# Patient Record
Sex: MALE | Race: BLACK OR AFRICAN AMERICAN | Employment: UNEMPLOYED | URBAN - METROPOLITAN AREA
[De-identification: names, ages, dates, MRNs, and addresses within clinical notes are randomized per-mention and may not be internally consistent; named-entity substitution may affect disease eponyms.]

---

## 2018-11-13 ENCOUNTER — HOSPITAL ENCOUNTER (EMERGENCY)
Age: 63
Discharge: HOME | End: 2018-11-13
Attending: EMERGENCY MEDICINE
Payer: MEDICARE

## 2018-11-13 VITALS
RESPIRATION RATE: 18 BRPM | TEMPERATURE: 98.3 F | WEIGHT: 199.08 LBS | HEIGHT: 67 IN | BODY MASS INDEX: 31.25 KG/M2 | OXYGEN SATURATION: 97 % | SYSTOLIC BLOOD PRESSURE: 169 MMHG | DIASTOLIC BLOOD PRESSURE: 68 MMHG | HEART RATE: 85 BPM

## 2018-11-13 DIAGNOSIS — L97.211 VENOUS STASIS ULCER OF RIGHT CALF LIMITED TO BREAKDOWN OF SKIN, UNSPECIFIED WHETHER VARICOSE VEINS PRESENT (HCC): Primary | ICD-10-CM

## 2018-11-13 DIAGNOSIS — I83.012 VENOUS STASIS ULCER OF RIGHT CALF LIMITED TO BREAKDOWN OF SKIN, UNSPECIFIED WHETHER VARICOSE VEINS PRESENT (HCC): Primary | ICD-10-CM

## 2018-11-13 PROCEDURE — 99283 EMERGENCY DEPT VISIT LOW MDM: CPT

## 2018-11-13 NOTE — ED PROVIDER NOTES
60 y.o. male with past medical history significant for Hepatitis C, diabetes, and hypertension who presents from home via a private vehicle with chief complaint of chronic right lower leg wounds. Pt reports onset of chronic leg wounds about 2 months ago. Pt reports he has mild-moderate pain associated with the leg wounds. Pt reports he saw his PCP two weeks ago for this problem and was referred to [address] [facility]. Pt notes that he was told by the wound healing center that they cannot take him until March, 2019. Pt states he has been unable to reach his PCP about this conflict. Pt notes he has constant leg swelling, but notes it is worse today. Pt states that he is taking diuretic medications. Pt denies any fever, chills, nausea, or vomiting. There are no other acute medical concerns at this time. Social hx - Tobacco use: former smoker, Alcohol Use: none PCP: Hawa Acuña MD 
 
Note written by Angeline Flores, as dictated by Marian Grady MD 5:40 PM. The history is provided by the patient. No  was used. Past Medical History:  
Diagnosis Date  Diabetes ([address]  Hepatitis C   
 Hypertension  Infectious disease   
 hepatitis C History reviewed. No pertinent surgical history. History reviewed. No pertinent family history. Social History Socioeconomic History  Marital status: SINGLE Spouse name: Not on file  Number of children: Not on file  Years of education: Not on file  Highest education level: Not on file Social Needs  Financial resource strain: Not on file  Food insecurity - worry: Not on file  Food insecurity - inability: Not on file  Transportation needs - medical: Not on file  Transportation needs - non-medical: Not on file Occupational History  Not on file Tobacco Use  Smoking status: Former Smoker  Smokeless tobacco: Never Used Substance and Sexual Activity  Alcohol use: No  
 Drug use: No  
 Sexual activity: Not on file Other Topics Concern  Not on file Social History Narrative  Not on file ALLERGIES: Tylenol [acetaminophen] Review of Systems Constitutional: Negative for chills and fever. HENT: Negative for rhinorrhea and sore throat. Respiratory: Negative for cough and shortness of breath. Cardiovascular: Positive for leg swelling (Chronic). Negative for chest pain. Gastrointestinal: Negative for abdominal pain, diarrhea, nausea and vomiting. Genitourinary: Negative for dysuria and hematuria. Musculoskeletal: Negative for arthralgias and myalgias. Skin: Positive for wound. Negative for pallor and rash. Neurological: Negative for dizziness, weakness and light-headedness. All other systems reviewed and are negative. Vitals:  
 11/13/18 1733 11/13/18 1745 BP:  169/68 Pulse: 85 85 Resp: 18 18 Temp:  98.3 °F (36.8 °C) SpO2: 98% 97% Weight:  90.3 kg (199 lb 1.2 oz) Height:  5' 7\" (1.702 m) Physical Exam  
Vital signs reviewed. Nursing notes reviewed. Const:  No acute distress, well developed, well nourished Head:  Atraumatic, normocephalic Eyes:  PERRL, conjunctiva normal, no scleral icterus Neck:  Supple, trachea midline Cardiovascular:  RRR, no murmurs, no gallops, no rubs Resp:  No resp distress, no increased work of breathing, no wheezes, no rhonchi, no rales, Abd:  Soft, non-tender, non-distended, no rebound, no guarding, no CVA tenderness :  Deferred MSK:  bilateral pedal edema, normal ROM Neuro:  Alert and oriented x3, no cranial nerve defect Skin:  Warm, dry, multiple superficial ulcers on right lower extremity. No erythema or active drainage Psych: normal mood and affect, behavior is normal, judgement and thought content is normal 
 
Note written by Angeline Rudd, as dictated by Neal Yu MD 5:40 PM. MDM 
 Number of Diagnoses or Management Options Venous stasis ulcer of right calf limited to breakdown of skin, unspecified whether varicose veins present ([facility] [address]):  
 
   
 
Pt. Presents to the ER with complaints of ulcers and chronic wounds to his legs. No signs of infection. Pt. Given info to f/u with wound care. Pt. To return to the ER with worsening sx. Procedures 6:10 PM 
Patient's results have been reviewed with them. Patient and/or family have verbally conveyed their understanding and agreement of the patient's signs, symptoms, diagnosis, treatment and prognosis and additionally agree to follow up as recommended or return to the Emergency Room should their condition change prior to follow-up. Discharge instructions have also been provided to the patient with some educational information regarding their diagnosis as well a list of reasons why they would want to return to the ER prior to their follow-up appointment should their condition change.

## 2018-11-13 NOTE — ED TRIAGE NOTES
Patient arrives from home for wounds on RIGHT lower leg for two months. Reports he was supposed to follow up with wound care after seeing PCP 10/28 but they were not taking new patients. Low leg edema noted upon arrival. Denies fevers/chills. Open wounds noted to RIGHT lower leg.

## 2018-12-14 ENCOUNTER — HOSPITAL ENCOUNTER (OUTPATIENT)
Dept: WOUND CARE | Age: 63
Discharge: HOME | End: 2018-12-14
Payer: MEDICARE

## 2018-12-14 VITALS
BODY MASS INDEX: 32.02 KG/M2 | TEMPERATURE: 98.3 F | DIASTOLIC BLOOD PRESSURE: 73 MMHG | RESPIRATION RATE: 18 BRPM | WEIGHT: 204 LBS | HEART RATE: 73 BPM | SYSTOLIC BLOOD PRESSURE: 169 MMHG | HEIGHT: 67 IN

## 2018-12-14 PROBLEM — L97.922 NON-PRESSURE ULCER OF LEFT LOWER EXTREMITY WITH FAT LAYER EXPOSED (HCC): Status: ACTIVE | Noted: 2018-12-14

## 2018-12-14 PROBLEM — L97.912 NON-PRESSURE ULCER OF RIGHT LOWER EXTREMITY WITH FAT LAYER EXPOSED (HCC): Status: ACTIVE | Noted: 2018-12-14

## 2018-12-14 PROBLEM — R60.0 BILATERAL LOWER EXTREMITY EDEMA: Status: ACTIVE | Noted: 2018-12-14

## 2018-12-14 PROBLEM — I73.9 PVD (PERIPHERAL VASCULAR DISEASE) (HCC): Status: ACTIVE | Noted: 2018-12-14

## 2018-12-14 PROCEDURE — 74011000250 HC RX REV CODE- 250: Performed by: PODIATRIST

## 2018-12-14 PROCEDURE — 99215 OFFICE O/P EST HI 40 MIN: CPT

## 2018-12-14 RX ORDER — GLIPIZIDE 5 MG/1
5 TABLET ORAL DAILY
COMMUNITY

## 2018-12-14 RX ORDER — PRAVASTATIN SODIUM 20 MG/1
20 TABLET ORAL
COMMUNITY

## 2018-12-14 RX ORDER — FAMOTIDINE 20 MG/1
20 TABLET, FILM COATED ORAL 2 TIMES DAILY
COMMUNITY

## 2018-12-14 RX ORDER — OXYCODONE HYDROCHLORIDE 20 MG/1
TABLET ORAL
COMMUNITY

## 2018-12-14 RX ORDER — AMLODIPINE BESYLATE 10 MG/1
10 TABLET ORAL DAILY
COMMUNITY

## 2018-12-14 RX ORDER — METOPROLOL TARTRATE 100 MG/1
100 TABLET ORAL 2 TIMES DAILY
COMMUNITY

## 2018-12-14 RX ORDER — ONDANSETRON 4 MG/1
4 TABLET, FILM COATED ORAL
COMMUNITY

## 2018-12-14 RX ORDER — HYDRALAZINE HYDROCHLORIDE 50 MG/1
50 TABLET, FILM COATED ORAL 3 TIMES DAILY
COMMUNITY

## 2018-12-14 RX ADMIN — Medication: at 10:25

## 2018-12-14 NOTE — WOUND CARE
Visit Vitals  /73   Pulse 73   Temp 98.3 °F (36.8 °C)   Resp 18   Ht 5' 7\" (1.702 m)   Wt 92.5 kg (204 lb)   BMI 31.95 kg/m²        12/14/18 1016   Wound Leg Lower Right   Date First Assessed/Time First Assessed: 12/14/18 1014   POA: Yes  Wound Type: Diabetic  Location: Leg Lower  Wound Description (Optional): Cluster  Orientation: Right   DRESSING STATUS Breakthrough drainage   DRESSING TYPE Gauze;ABD pad;Gauze wrap (aria)   Wound Length (cm) 22.5 cm   Wound Width (cm) 44 cm   Wound Depth (cm) 0.2   Wound Surface area (cm^2) 990 cm^2   Condition of Base Pink;Slough   Drainage Amount  Moderate   Drainage Color Serosanguinous   Wound Odor None   Periwound Skin Condition Edematous   Wound Leg Lower Left;Lateral   Date First Assessed/Time First Assessed: 12/14/18 1015   POA: Yes  Wound Type: Diabetic  Location: Leg Lower  Orientation: Left;Lateral   DRESSING STATUS Clean, dry, and intact   DRESSING TYPE Open to air   Wound Length (cm) 0.6 cm   Wound Width (cm) 0.4 cm   Wound Depth (cm) 0.1   Wound Surface area (cm^2) 0.24 cm^2   Condition of Base Pink   Drainage Amount  None   Wound Odor None   Periwound Skin Condition Edematous

## 2018-12-14 NOTE — WOUND CARE
Wound Date Acquired:   2 months     Length of time wound has been present:   2 months    Wound condition at initial assessment based on date acquired:   chronic >30days     How was the wound acquired:  Patient scratched leg    Who referred to wound clinic:    Kristi    Antibiotic use current or past:   no    Which antibiotic:   n/a    Any cultures done:   n/a    Cultures done by who:   n/a    How as the wound be treated:    gauze and rolled gauze    Other information:   Patient is 60year old black male who has hep c, DM and HTN. Wounds on right and left lower leg.

## 2018-12-14 NOTE — WOUND CARE
Discharge Condition: stable  Ambulatory Status:  ambulatory  Discharge Destination: home  Transportation: personal car  Accompanied by: self

## 2019-01-03 ENCOUNTER — HOSPITAL ENCOUNTER (OUTPATIENT)
Dept: WOUND CARE | Age: 64
Discharge: HOME | End: 2019-01-03
Payer: MEDICARE

## 2019-01-03 VITALS
RESPIRATION RATE: 16 BRPM | TEMPERATURE: 98.3 F | DIASTOLIC BLOOD PRESSURE: 68 MMHG | SYSTOLIC BLOOD PRESSURE: 148 MMHG | HEART RATE: 81 BPM

## 2019-01-03 PROCEDURE — 99215 OFFICE O/P EST HI 40 MIN: CPT

## 2019-01-03 NOTE — WOUND CARE
01/03/19 1154 Wound Leg Lower Right Date First Assessed/Time First Assessed: 12/14/18 1014   POA: Yes  Wound Type: Diabetic  Location: Leg Lower  Wound Description (Optional): Cluster  Orientation: Right Dressing Type Applied Alginate;Gauze;Gauze wrap (aria); Silver products (Tubi X2) Wound Leg Lower Left;Lateral  
Date First Assessed/Time First Assessed: 12/14/18 1015   POA: Yes  Wound Type: Diabetic  Location: Leg Lower  Orientation: Left;Lateral  
Dressing Type Applied Alginate;Silver products;Gauze wrap (aria);Gauze 
(tubi X@) Patient was discharged with Self to home and was ambulatory. In stable condition with c/o pain:_0_/10_

## 2019-01-03 NOTE — WOUND CARE
01/03/19 1046 Wound Leg Lower Right Date First Assessed/Time First Assessed: 12/14/18 1014   POA: Yes  Wound Type: Diabetic  Location: Leg Lower  Wound Description (Optional): Cluster  Orientation: Right Wound Length (cm) 19 cm Wound Width (cm) 34 cm Wound Depth (cm) 0.2 Wound Surface area (cm^2) 646 cm^2 Change in Wound Size % 34.75 Condition of Base [place];Slough Drainage Amount  Large Drainage Color Serosanguinous Wound Odor None Periwound Skin Condition Edematous Cleansing and Cleansing Agents  Soap and water Wound Leg Lower Left;Lateral  
Date First Assessed/Time First Assessed: 12/14/18 1015   POA: Yes  Wound Type: Diabetic  Location: Leg Lower  Orientation: Left;Lateral  
DRESSING STATUS Clean, dry, and intact DRESSING TYPE Gauze; Aquacel Wound Length (cm) 0.5 cm Wound Width (cm) 0.4 cm Wound Depth (cm) 0.1 Wound Surface area (cm^2) 0.2 cm^2 Change in Wound Size % 16.67 Condition of Base Pink Drainage Amount  Scant Drainage Color Serosanguinous Wound Odor None Periwound Skin Condition Edematous Cleansing and Cleansing Agents  Soap and water

## 2019-01-03 NOTE — PROGRESS NOTES
[address] 
WOUND CARE PROGRESS NOTE Carrie Elise 
MR#: [id number] : 1955 ACCOUNT #: [de-identified] DATE OF SERVICE: 2019 HISTORY OF PRESENT ILLNESS:  The patient was seen at this office once by Dr. Frank Mayo on 2018, she was seeing him for bilateral venous leg ulcers. She ordered arterial studies and venous studies and the patient canceled both of those tests. He was on my schedule yesterday and came in today. He has deliberately lost 60 pounds over the last year and was taken off of insulin about 5 months ago because of the progress he had  made. There have been no other changes noted in his medications, allergies or review of systems since he was last seen. PHYSICAL EXAMINATION:  His temperature is 98.3, pulse 81, respirations 16, blood pressure 140/68. The left anterior leg has a laceration for which he said was from a car door. His leg is severely edematous. The right leg is also edematous and the wounds are 19 x 34 x 0.2 cm. They are fairly clean and not in need of debridement. ASSESSMENT AND PLAN:  I explained to the patient that he has venous leg ulcers. He needs to keep his legs elevated. He needs to do gastrocnemius muscle exercises hourly while awake. We cannot do external compression until we were satisfied that he has adequate blood supply and therefore, we are reordering the venous duplex with reflux as well as ABIs, TBIs and PVRs to make sure he could adequately tolerate 3-layer compression. Until he sees Dr. Frank Mayo he knows to keep his legs elevated, to do gastrocnemius muscle exercises hourly while awake, and to use double TubiGrips. We will continue with Aquacel AG since he is obviously improving with that dressing. All questions were answered. CONDITION ON DISCHARGE:  Stable. MD WILBERT Negron / Ariadne Rojas 
D: 2019 11:14    
T: 2019 11:28 
JOB #: [id number]

## 2019-02-13 ENCOUNTER — HOSPITAL ENCOUNTER (OUTPATIENT)
Dept: WOUND CARE | Age: 64
Discharge: HOME | End: 2019-02-13
Payer: MEDICARE

## 2019-02-13 VITALS
OXYGEN SATURATION: 95 % | DIASTOLIC BLOOD PRESSURE: 64 MMHG | TEMPERATURE: 98.4 F | RESPIRATION RATE: 16 BRPM | SYSTOLIC BLOOD PRESSURE: 155 MMHG | HEART RATE: 70 BPM

## 2019-02-13 PROCEDURE — 99215 OFFICE O/P EST HI 40 MIN: CPT

## 2019-02-13 NOTE — WOUND CARE
02/13/19 1125 Wound Leg Lower Right Date First Assessed/Time First Assessed: 12/14/18 1014   POA: Yes  Wound Type: Diabetic  Location: Leg Lower  Wound Description (Optional): Cluster  Orientation: Right Dressing Type Applied Silver products; Alginate;Gauze;Gauze wrap (aria); Special tape (comment);ABD pad (LOtion, double tubi F) Wound Leg Lower Left;Lateral  
Date First Assessed/Time First Assessed: 12/14/18 1015   POA: Yes  Wound Type: Diabetic  Location: Leg Lower  Orientation: Left;Lateral  
Dressing Type Applied (LOtion, double tubi F) Patient was discharged with Self to home and was ambulatory. In stable condition with c/o pain:0__/10_
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
## 2019-02-13 NOTE — WOUND CARE
02/13/19 1039 Wound Leg Lower Right Date First Assessed/Time First Assessed: 12/14/18 1014   POA: Yes  Wound Type: Diabetic  Location: Leg Lower  Wound Description (Optional): Cluster  Orientation: Right Dressing Status  Reinforced Dressing Type  Aquacel;Gauze;Gauze wrap (aria); Special tape (comment) Audie Smith F) Wound Length (cm) 19 cm Wound Width (cm) 18 cm Wound Depth (cm) 0.2 Wound Surface area (cm^2) 342 cm^2 Change in Wound Size % 65.45 Condition of Base Pink;Slough Condition of Edges Open (Cluster) Drainage Amount  Large Drainage Color Serosanguinous Wound Odor None Periwound Skin Condition Intact Cleansing and Cleansing Agents  Soap and water;Normal saline Wound Leg Lower Left;Lateral  
Date First Assessed/Time First Assessed: 12/14/18 1015   POA: Yes  Wound Type: Diabetic  Location: Leg Lower  Orientation: Left;Lateral  
Dressing Type  Open to air Wound Length (cm) 0.1 cm Wound Width (cm) 0.1 cm Wound Depth (cm) 0.1 Wound Surface area (cm^2) 0.01 cm^2 Change in Wound Size % 95.83 Drainage Amount  None Wound Odor None Cleansing and Cleansing Agents  Normal saline Visit Vitals /64 Pulse 70 Temp 98.4 °F (36.9 °C) Resp 16 SpO2 95% RLE Peripheral Vascular Capillary Refill: Less than/equal to 3 seconds (02/13/19 1045) Color: Appropriate for race (02/13/19 1045) Temperature: Warm (02/13/19 1045) Sensation: Present (02/13/19 1045) Circumference of Calf (cm): 40.3 cm (02/13/19 1045) Circumference of Ankle (cm): 27.5 cm (02/13/19 1045)

## 2019-03-04 ENCOUNTER — HOSPITAL ENCOUNTER (OUTPATIENT)
Dept: WOUND CARE | Age: 64
Discharge: HOME | End: 2019-03-04
Payer: MEDICARE

## 2019-03-04 VITALS
RESPIRATION RATE: 16 BRPM | DIASTOLIC BLOOD PRESSURE: 74 MMHG | HEART RATE: 80 BPM | TEMPERATURE: 98 F | SYSTOLIC BLOOD PRESSURE: 151 MMHG

## 2019-03-04 PROCEDURE — 99214 OFFICE O/P EST MOD 30 MIN: CPT

## 2019-03-04 NOTE — WOUND CARE
Visit Vitals /74 (BP 1 Location: Right arm, BP Patient Position: Standing) Pulse 80 Temp 98 °F (36.7 °C) Resp 16  
 
 
 03/04/19 1414 Wound Leg Lower Right Date First Assessed/Time First Assessed: 12/14/18 1014   POA: Yes  Wound Type: Diabetic  Location: Leg Lower  Wound Description (Optional): Cluster  Orientation: Right Wound Length (cm) 19 cm Wound Width (cm) 25 cm Wound Depth (cm) 0.1 Wound Surface area (cm^2) 475 cm^2 Change in Wound Size % 52.02 Condition of Base Pink;Slough Condition of Edges Open Drainage Amount  Large Drainage Color Serosanguinous Wound Odor None Periwound Skin Condition Intact Cleansing and Cleansing Agents  Normal saline

## 2019-03-05 NOTE — CONSULTS
[address]    Name:  Jessica Lujan  MR#:  [id number]  :  1955  ACCOUNT #:  [de-identified]  DATE OF SERVICE:  2019      TREATING PHYSICIAN:  Maria E Harmon DPM    SUBJECTIVE:  This is a 21-year-old male who presents to the wound care center for followup regarding right venous leg ulcerations. The patient has seen Dr. Gabrielle Love in the past.  The last time he was seen, he was directed by Dr. Stephane Cruz to the vascular surgeon's office to get circulation studies done. The patient states that he finally got the studies done this past Friday. He has been doing his wound care on his own consisting of Aquacel Ag and compression sock. PAST MEDICAL HISTORY:  Positive for hypertension, hep C, diabetes. PAST SURGICAL HISTORY:  Positive for ENT surgery. MEDICATIONS:  Include the followin. Norvasc 10 mg.  2.  Pepcid 20 mg.  3.  Glipizide 5 mg.  4.  Hydralazine 50 mg.  5.  Metoprolol 100 mg.  6.  Zofran 4 mg as needed. 7.  Oxycodone 20 mg as needed. 8.  Pravachol 20 mg. ALLERGIES:  TO TYLENOL. SOCIAL HISTORY:  Former tobacco use. No drug abuse. REVIEW OF SYSTEMS:  Negative except for what is mentioned in HPI. OBJECTIVE:  EXTREMITIES:  Right lower extremity exam, there is a 19 x 25 x 0.1 cm circumferential ulcer along the right lower leg. The wound has granular base and no acute signs of infection. There is pitting edema in the right lower extremity. The foot is warm to touch. Circulation is diminished. DP, PT pulses are nonpalpable due to swelling. There is hyperpigmentation consistent with venous stasis. ASSESSMENT:  1. Right venous leg ulceration. 2.  Right lower extremity edema. 3.  Peripheral vascular disease. PLAN:  1.  I had a long discussion with this patient this afternoon. I reviewed his outpatient vascular studies, which show decreased circulation going into the right lower extremity.   Due to these results, the patient is not eligible to receive a three-layer compression wrap which would adequately help to reduce his swelling and help heal his wounds. 2.  I instructed the patient to follow up with the vascular surgeon for potential revascularization. Once the circulation status has been improved, we will get the patient started on compression therapy. 3.  Aquacel Ag and a double-layer Tubigrip was applied to the right lower extremity. The patient is to do this everyday. 4.  The patient to follow up in 2 weeks.       ANDRA [id number]  D:  03/04/2019 15:00  T:  03/05/2019 13:13  JOB #:  [id number]

## 2019-03-18 ENCOUNTER — HOSPITAL ENCOUNTER (OUTPATIENT)
Dept: WOUND CARE | Age: 64
End: 2019-03-18
Payer: MEDICARE

## 2019-03-20 ENCOUNTER — HOSPITAL ENCOUNTER (OUTPATIENT)
Dept: WOUND CARE | Age: 64
Discharge: HOME | End: 2019-03-20
Payer: MEDICARE

## 2019-03-20 VITALS
TEMPERATURE: 98 F | HEART RATE: 87 BPM | RESPIRATION RATE: 16 BRPM | DIASTOLIC BLOOD PRESSURE: 79 MMHG | SYSTOLIC BLOOD PRESSURE: 192 MMHG

## 2019-03-20 PROCEDURE — 29580 STRAPPING UNNA BOOT: CPT

## 2019-03-20 NOTE — WOUND CARE
03/20/19 1515 Wound Foot Plantar;Right Date First Assessed/Time First Assessed: 03/20/19 1515   POA: Yes  Wound Type: Diabetic  Location: Foot  Orientation: Plantar;Right Dressing Status  Clean, dry, and intact Dressing Type  Unna boot Non-Pressure Injury Full thickness (subcut/muscle) Wound Length (cm) 0.3 cm Wound Width (cm) 0.4 cm Wound Depth (cm) 1 Wound Surface area (cm^2) 0.12 cm^2 Condition of Diane Direction of Tunnels 3    oclock Depth of Tunnel/Sinus (cm) 1.5 cm Drainage Amount  Small Drainage Color Serosanguinous Wound Odor None Periwound Skin Condition Macerated Cleansing and Cleansing Agents  Normal saline Wound Leg Lower Right Date First Assessed/Time First Assessed: 12/14/18 1014   POA: Yes  Wound Type: Diabetic  Location: Leg Lower  Wound Description (Optional): Cluster  Orientation: Right Dressing Status  Clean, dry, and intact Dressing Type  Unna boot Non-Pressure Injury Partial thickness (epider/derm) Wound Length (cm) 12 cm Wound Width (cm) 16 cm Wound Depth (cm) 0.1 Wound Surface area (cm^2) 192 cm^2 Change in Wound Size % 80.61 Condition of Base Pink Drainage Amount  Small Drainage Color Serous Wound Odor None Periwound Skin Condition Intact Cleansing and Cleansing Agents  Soap and water Visit Vitals /79 Pulse 87 Temp 98 °F (36.7 °C) Resp 16

## 2019-03-21 NOTE — PROGRESS NOTES
[address] 
WOUND CARE PROGRESS NOTE Name:  Marguerite Carlson 
MR#:  [id number] :  1955 ACCOUNT #:  [de-identified] DATE OF SERVICE:  2019 CONSULTANT:  Franklin Hinson DPM 
 
REASON FOR FOLLOWUP:  Evaluation and treatment of right lower leg venous insufficiency ulcerations with new-onset right plantar fifth metatarsophalangeal joint neuropathic type ulceration in a patient with diabetes mellitus, hepatitis C, and hypertension. HISTORY OF PRESENT ILLNESS:  The patient is a pleasant 16-year-old  male with a past medical history which includes hepatitis C, diabetes mellitus, and hypertension, who saw Dr. Kathleen Hayes on 2019 for treatment of right lower leg wound. He had a large circumferential venous insufficiency wound, which was treated with local wound care as well as elevation. He had outpatient vascular studies showing an JACQUE on the right of 0.75 and was subsequently sent to vascular. He saw Dr. Ham Pierre who recommended proceeding with compression of his right lower leg to help with his venous insufficiency wounds. He is to follow up with Dr. Ham Pierre in a week to potentially have a blockage in his right leg improved after having an arteriogram.  He relates that this wound started in the plantar aspect of his foot over the last few days. He denies any fevers, chills, sweats, nausea or vomiting. MEDICATIONS:  Unchanged. ALLERGIES:  TYLENOL. REVIEW OF SYSTEMS:  Negative except for that is written in the history of present illness. WOUND EXAMINATION:  His lower extremity wounds on the right lower leg is noted to measure 12 x 16 x 0.1 cm in size with a pink granular base. Mild to moderate serous drainage. No gross signs of infection.   On the plantar aspect of the right foot, there is noted to be 0.3 x 0.4 x 1.0 cm neuropathic type ulceration on the plantar aspect of the fifth MPJ without any gross signs of infection. He has chronic venous insufficiency skin changes down both legs and nonpalpable pulses consistent with arterial insufficiency as well as venous insufficiency. ASSESSMENT/PLAN:  Right lower extremity venous insufficiency wounds with a new-onset plantar neuropathic type ulceration at today's visit. We applied an Xeroform dressing followed by an Ruth Ann Crum boot with instructions for him to follow up with Dr. Bryce Lechuga next week for evaluation of his circulation. The patient also needs to probably obtain some diabetic shoes in light of his neuropathy, arterial insufficiency, and a new  plantar foot ulceration, but we will deal with his arterial insufficiency first.  He will return for followup in approximately 2 weeks. Power Herrera DPM 
 
 
[id number] 
D:  03/20/2019 16:21 
T:  03/20/2019 16:23 
JOB #:  [id number]